# Patient Record
(demographics unavailable — no encounter records)

---

## 2017-01-01 NOTE — DS
- Maternal History


Mother's Age: 35 yo


 Status: 


HBSAG: Negative


Date: 16


RPR: Negative


Date: 16


Group B Strep: Negative


HIV: Negative





- Maternal Risks


OB Risks: post dates





[place] Data





- Admission


Date of Admission: 17


Admission Time: 21:52


Date of Delivery: 17


Time of Delivery: 21:25


Wks Gestation by Dates: 40.5


Wks Gestation by Sono: 40.5


Infant Gender: Male


Type of Delivery: 


Apgar Score @1 Minute: 9


Apgar score @ 5 Minutes: 9


Birth Weight: 7 lb 9.166 oz


Birth Length: 19.5 in


Head Circumference, Admission: 35


Chest Circumference: 33.5


Abdominal Girth: 32





- Vital Signs


  ** Right Lower Arm


Blood Pressure: 64/41


Blood Pressure Mean: 48





  ** Left Lower Arm


Blood Pressure: 61/35


Blood Pressure Mean: 43





  ** Right Calf


Blood Pressure: 62/42


Blood Pressure Mean: 48





  ** Left Calf


Blood Pressure: 70/40


Blood Pressure Mean: 50





- Hearing Screen


Left Ear: Passed


Right Ear: Passed


Hearing Screen Complete: 17





- Labs


Labs: 


 Transcutaneous Bilirubin











Transcutaneous Bilirubin       17





performed                      


 


Transcutaneous Bilirubin       4.9





result                         











 Baby's Blood Type, Xavi











Cord Blood Type  O POSITIVE   17  22:00    


 


ANKUSH, Poly Interpret  Negative  (NEGATIVE)   17  22:00    














[place] PE, Discharge





- Physical Exam


Last Weight Documented: 7 lb 3.346 oz


Vital Signs: 


 Vital Signs











Temperature  98.3 F   17 07:30


 


Pulse Rate  132   17 22:00


 


Respiratory Rate  44   17 22:00


 


Blood Pressure  64/41   17 13:18


 


O2 Sat by Pulse Oximetry (%)      








 SpO2





Preductal SpO2, Right Arm        100


Postductal SpO2 [Left Leg]       99








General Appearance: Yes: Well flexed, Spontaneous movements


Skin: No: Rashes


Head: Yes: Fontanel flat


Eyes: Yes: Red reflex present


Ears: Yes: Symmetrical


Nose: Yes: Nares patent


Mouth: No: Cleft lip, Cleft palate


Chest: Yes: Symmetrical


Lungs/Respiratory: Yes: Bilateral good air entry


Cardiac: Yes: S1, S2.  No: Murmur


Abdomen: No: Mass palpable


Gastrointestinal: Yes: No Abnormalities


Genitalia: No Abnormalities


Genitalia, Male: Yes: Bilateral testes descended


Anus: Yes: Patent


Extremities: Yes: No Abnormalities


Spine: No: Sacral dimple


Reflexes: [place]: Present, Rooting: Present, Sucking: Present


Neuro: Yes: Alert, Active


Cry: Yes: Strong


Preductal SpO2, Right Arm: 100


  ** Left Leg


Postductal SpO2: 99





Problem List





- Problems


(1) Single liveborn infant delivered vaginally


Assessment/Plan: 


FTAGA male doing fine


discharge home


f/u 3-5 days with PCP Dr Ivey


[phone]


Code(s): Z38.00 - SINGLE LIVEBORN INFANT, DELIVERED VAGINALLY








Discharge Summary


Reason For Visit:  ADMIT


Current Active Problems





Single liveborn infant delivered vaginally (Acute) 








Condition: Good





- Instructions


Disposition: HOME